# Patient Record
Sex: MALE | Race: WHITE | NOT HISPANIC OR LATINO | Employment: FULL TIME | URBAN - METROPOLITAN AREA
[De-identification: names, ages, dates, MRNs, and addresses within clinical notes are randomized per-mention and may not be internally consistent; named-entity substitution may affect disease eponyms.]

---

## 2023-05-30 LAB
ALANINE AMINOTRANSFERASE (SGPT) (U/L) IN SER/PLAS: 21 U/L (ref 10–52)
ALBUMIN (G/DL) IN SER/PLAS: 4.5 G/DL (ref 3.4–5)
ALKALINE PHOSPHATASE (U/L) IN SER/PLAS: 45 U/L (ref 33–136)
ANION GAP IN SER/PLAS: 13 MMOL/L (ref 10–20)
APPEARANCE, URINE: NORMAL
ASPARTATE AMINOTRANSFERASE (SGOT) (U/L) IN SER/PLAS: 20 U/L (ref 9–39)
BASOPHILS (10*3/UL) IN BLOOD BY AUTOMATED COUNT: 0.07 X10E9/L (ref 0–0.1)
BASOPHILS/100 LEUKOCYTES IN BLOOD BY AUTOMATED COUNT: 1 % (ref 0–2)
BILIRUBIN DIRECT (MG/DL) IN SER/PLAS: 0.2 MG/DL (ref 0–0.3)
BILIRUBIN TOTAL (MG/DL) IN SER/PLAS: 1.4 MG/DL (ref 0–1.2)
BILIRUBIN, URINE: NEGATIVE
BLOOD, URINE: NEGATIVE
CALCIDIOL (25 OH VITAMIN D3) (NG/ML) IN SER/PLAS: 42 NG/ML
CALCIUM (MG/DL) IN SER/PLAS: 10 MG/DL (ref 8.6–10.6)
CARBON DIOXIDE, TOTAL (MMOL/L) IN SER/PLAS: 30 MMOL/L (ref 21–32)
CHLORIDE (MMOL/L) IN SER/PLAS: 105 MMOL/L (ref 98–107)
CHOLESTEROL (MG/DL) IN SER/PLAS: 166 MG/DL (ref 0–199)
CHOLESTEROL IN HDL (MG/DL) IN SER/PLAS: 59.7 MG/DL
CHOLESTEROL/HDL RATIO: 2.8
COLOR, URINE: NORMAL
CREATININE (MG/DL) IN SER/PLAS: 0.97 MG/DL (ref 0.5–1.3)
EOSINOPHILS (10*3/UL) IN BLOOD BY AUTOMATED COUNT: 0.28 X10E9/L (ref 0–0.7)
EOSINOPHILS/100 LEUKOCYTES IN BLOOD BY AUTOMATED COUNT: 4 % (ref 0–6)
ERYTHROCYTE DISTRIBUTION WIDTH (RATIO) BY AUTOMATED COUNT: 13.2 % (ref 11.5–14.5)
ERYTHROCYTE MEAN CORPUSCULAR HEMOGLOBIN CONCENTRATION (G/DL) BY AUTOMATED: 31.8 G/DL (ref 32–36)
ERYTHROCYTE MEAN CORPUSCULAR VOLUME (FL) BY AUTOMATED COUNT: 88 FL (ref 80–100)
ERYTHROCYTES (10*6/UL) IN BLOOD BY AUTOMATED COUNT: 5.05 X10E12/L (ref 4.5–5.9)
GFR MALE: 84 ML/MIN/1.73M2
GLUCOSE (MG/DL) IN SER/PLAS: 83 MG/DL (ref 74–99)
GLUCOSE, URINE: NEGATIVE MG/DL
HEMATOCRIT (%) IN BLOOD BY AUTOMATED COUNT: 44.6 % (ref 41–52)
HEMOGLOBIN (G/DL) IN BLOOD: 14.2 G/DL (ref 13.5–17.5)
IMMATURE GRANULOCYTES/100 LEUKOCYTES IN BLOOD BY AUTOMATED COUNT: 0.6 % (ref 0–0.9)
KETONES, URINE: NEGATIVE MG/DL
LDL: 90 MG/DL (ref 0–99)
LEUKOCYTE ESTERASE, URINE: NEGATIVE
LEUKOCYTES (10*3/UL) IN BLOOD BY AUTOMATED COUNT: 6.9 X10E9/L (ref 4.4–11.3)
LYMPHOCYTES (10*3/UL) IN BLOOD BY AUTOMATED COUNT: 2.02 X10E9/L (ref 1.2–4.8)
LYMPHOCYTES/100 LEUKOCYTES IN BLOOD BY AUTOMATED COUNT: 29.1 % (ref 13–44)
MONOCYTES (10*3/UL) IN BLOOD BY AUTOMATED COUNT: 0.48 X10E9/L (ref 0.1–1)
MONOCYTES/100 LEUKOCYTES IN BLOOD BY AUTOMATED COUNT: 6.9 % (ref 2–10)
NEUTROPHILS (10*3/UL) IN BLOOD BY AUTOMATED COUNT: 4.04 X10E9/L (ref 1.2–7.7)
NEUTROPHILS/100 LEUKOCYTES IN BLOOD BY AUTOMATED COUNT: 58.4 % (ref 40–80)
NITRITE, URINE: NEGATIVE
NRBC (PER 100 WBCS) BY AUTOMATED COUNT: 0 /100 WBC (ref 0–0)
PH, URINE: 5 (ref 5–8)
PLATELETS (10*3/UL) IN BLOOD AUTOMATED COUNT: 165 X10E9/L (ref 150–450)
POTASSIUM (MMOL/L) IN SER/PLAS: 4.7 MMOL/L (ref 3.5–5.3)
PROSTATE SPECIFIC ANTIGEN,SCREEN: 0.66 NG/ML (ref 0–4)
PROTEIN TOTAL: 6.7 G/DL (ref 6.4–8.2)
PROTEIN, URINE: NEGATIVE MG/DL
SODIUM (MMOL/L) IN SER/PLAS: 143 MMOL/L (ref 136–145)
SPECIFIC GRAVITY, URINE: 1.03 (ref 1–1.03)
TRIGLYCERIDE (MG/DL) IN SER/PLAS: 80 MG/DL (ref 0–149)
UREA NITROGEN (MG/DL) IN SER/PLAS: 19 MG/DL (ref 6–23)
UROBILINOGEN, URINE: <2 MG/DL (ref 0–1.9)
VLDL: 16 MG/DL (ref 0–40)

## 2024-04-15 DIAGNOSIS — E78.5 HYPERLIPIDEMIA, UNSPECIFIED HYPERLIPIDEMIA TYPE: ICD-10-CM

## 2024-04-15 DIAGNOSIS — R53.83 OTHER FATIGUE: Primary | ICD-10-CM

## 2024-04-15 DIAGNOSIS — N40.0 BENIGN PROSTATIC HYPERPLASIA WITHOUT LOWER URINARY TRACT SYMPTOMS: ICD-10-CM

## 2024-04-15 DIAGNOSIS — E55.9 VITAMIN D DEFICIENCY: ICD-10-CM

## 2024-04-17 ENCOUNTER — LAB (OUTPATIENT)
Dept: LAB | Facility: LAB | Age: 69
End: 2024-04-17
Payer: COMMERCIAL

## 2024-04-17 DIAGNOSIS — N40.0 BENIGN PROSTATIC HYPERPLASIA WITHOUT LOWER URINARY TRACT SYMPTOMS: ICD-10-CM

## 2024-04-17 DIAGNOSIS — E78.5 HYPERLIPIDEMIA, UNSPECIFIED HYPERLIPIDEMIA TYPE: ICD-10-CM

## 2024-04-17 DIAGNOSIS — E55.9 VITAMIN D DEFICIENCY: ICD-10-CM

## 2024-04-17 DIAGNOSIS — R53.83 OTHER FATIGUE: ICD-10-CM

## 2024-04-17 LAB
APPEARANCE UR: CLEAR
BILIRUB UR STRIP.AUTO-MCNC: NEGATIVE MG/DL
COLOR UR: NORMAL
ERYTHROCYTE [DISTWIDTH] IN BLOOD BY AUTOMATED COUNT: 12.8 % (ref 11.5–14.5)
GLUCOSE UR STRIP.AUTO-MCNC: NORMAL MG/DL
HCT VFR BLD AUTO: 43.7 % (ref 41–52)
HGB BLD-MCNC: 14.5 G/DL (ref 13.5–17.5)
KETONES UR STRIP.AUTO-MCNC: NEGATIVE MG/DL
LEUKOCYTE ESTERASE UR QL STRIP.AUTO: NEGATIVE
MCH RBC QN AUTO: 28.5 PG (ref 26–34)
MCHC RBC AUTO-ENTMCNC: 33.2 G/DL (ref 32–36)
MCV RBC AUTO: 86 FL (ref 80–100)
NITRITE UR QL STRIP.AUTO: NEGATIVE
NRBC BLD-RTO: 0 /100 WBCS (ref 0–0)
PH UR STRIP.AUTO: 5.5 [PH]
PLATELET # BLD AUTO: 182 X10*3/UL (ref 150–450)
PROT UR STRIP.AUTO-MCNC: NEGATIVE MG/DL
RBC # BLD AUTO: 5.09 X10*6/UL (ref 4.5–5.9)
RBC # UR STRIP.AUTO: NEGATIVE /UL
SP GR UR STRIP.AUTO: 1.01
UROBILINOGEN UR STRIP.AUTO-MCNC: NORMAL MG/DL
WBC # BLD AUTO: 7.3 X10*3/UL (ref 4.4–11.3)

## 2024-04-17 PROCEDURE — 84443 ASSAY THYROID STIM HORMONE: CPT

## 2024-04-17 PROCEDURE — 80061 LIPID PANEL: CPT

## 2024-04-17 PROCEDURE — 36415 COLL VENOUS BLD VENIPUNCTURE: CPT

## 2024-04-17 PROCEDURE — 84153 ASSAY OF PSA TOTAL: CPT

## 2024-04-17 PROCEDURE — 80053 COMPREHEN METABOLIC PANEL: CPT

## 2024-04-17 PROCEDURE — 82306 VITAMIN D 25 HYDROXY: CPT

## 2024-04-17 PROCEDURE — 81003 URINALYSIS AUTO W/O SCOPE: CPT

## 2024-04-17 PROCEDURE — 85027 COMPLETE CBC AUTOMATED: CPT

## 2024-04-18 ENCOUNTER — OFFICE VISIT (OUTPATIENT)
Dept: GASTROENTEROLOGY | Facility: CLINIC | Age: 69
End: 2024-04-18
Payer: COMMERCIAL

## 2024-04-18 VITALS — HEART RATE: 71 BPM | BODY MASS INDEX: 24.59 KG/M2 | HEIGHT: 66 IN | OXYGEN SATURATION: 97 % | WEIGHT: 153 LBS

## 2024-04-18 DIAGNOSIS — E78.5 HYPERLIPIDEMIA, UNSPECIFIED HYPERLIPIDEMIA TYPE: Primary | ICD-10-CM

## 2024-04-18 LAB
25(OH)D3 SERPL-MCNC: 36 NG/ML (ref 30–100)
ALBUMIN SERPL BCP-MCNC: 4.4 G/DL (ref 3.4–5)
ALP SERPL-CCNC: 46 U/L (ref 33–136)
ALT SERPL W P-5'-P-CCNC: 19 U/L (ref 10–52)
ANION GAP SERPL CALC-SCNC: 14 MMOL/L (ref 10–20)
AST SERPL W P-5'-P-CCNC: 16 U/L (ref 9–39)
BILIRUB SERPL-MCNC: 1.4 MG/DL (ref 0–1.2)
BUN SERPL-MCNC: 14 MG/DL (ref 6–23)
CALCIUM SERPL-MCNC: 9.4 MG/DL (ref 8.6–10.6)
CHLORIDE SERPL-SCNC: 104 MMOL/L (ref 98–107)
CHOLEST SERPL-MCNC: 165 MG/DL (ref 0–199)
CHOLESTEROL/HDL RATIO: 2.8
CO2 SERPL-SCNC: 27 MMOL/L (ref 21–32)
CREAT SERPL-MCNC: 0.89 MG/DL (ref 0.5–1.3)
EGFRCR SERPLBLD CKD-EPI 2021: >90 ML/MIN/1.73M*2
GLUCOSE SERPL-MCNC: 77 MG/DL (ref 74–99)
HDLC SERPL-MCNC: 57.9 MG/DL
HOLD SPECIMEN: NORMAL
LDLC SERPL CALC-MCNC: 96 MG/DL
NON HDL CHOLESTEROL: 107 MG/DL (ref 0–149)
POTASSIUM SERPL-SCNC: 4 MMOL/L (ref 3.5–5.3)
PROT SERPL-MCNC: 6.5 G/DL (ref 6.4–8.2)
PSA SERPL-MCNC: 1.28 NG/ML
SODIUM SERPL-SCNC: 141 MMOL/L (ref 136–145)
TRIGL SERPL-MCNC: 58 MG/DL (ref 0–149)
TSH SERPL-ACNC: 1.59 MIU/L (ref 0.44–3.98)
VLDL: 12 MG/DL (ref 0–40)

## 2024-04-18 PROCEDURE — 1159F MED LIST DOCD IN RCRD: CPT | Performed by: INTERNAL MEDICINE

## 2024-04-18 PROCEDURE — 99214 OFFICE O/P EST MOD 30 MIN: CPT | Performed by: INTERNAL MEDICINE

## 2024-04-18 RX ORDER — ASCORBIC ACID 500 MG
500 TABLET ORAL DAILY
COMMUNITY

## 2024-04-18 RX ORDER — ERGOCALCIFEROL 1.25 MG/1
1.25 CAPSULE ORAL
COMMUNITY

## 2024-04-18 RX ORDER — ATORVASTATIN CALCIUM 20 MG/1
TABLET, FILM COATED ORAL
COMMUNITY
Start: 2016-11-04 | End: 2024-04-30 | Stop reason: SDUPTHER

## 2024-04-18 ASSESSMENT — ENCOUNTER SYMPTOMS
SHORTNESS OF BREATH: 0
ABDOMINAL PAIN: 0
FATIGUE: 0
CONSTIPATION: 0
BLOOD IN STOOL: 0
DYSURIA: 0
COUGH: 0
DIARRHEA: 0
ARTHRALGIAS: 1
HEADACHES: 0
DIFFICULTY URINATING: 0

## 2024-04-18 NOTE — PROGRESS NOTES
Subjective     History of Present Illness:   Fidel Dorado is a 69 y.o. male here for physical.  Feeling well, some fatigue    Review of Systems  Review of Systems   Constitutional:  Negative for fatigue.   HENT:  Negative for nosebleeds.    Eyes:  Negative for visual disturbance.   Respiratory:  Negative for cough and shortness of breath.    Cardiovascular:  Negative for chest pain.   Gastrointestinal:  Negative for abdominal pain, blood in stool, constipation and diarrhea.   Genitourinary:  Negative for difficulty urinating and dysuria.        No sexual dysfunction   Musculoskeletal:  Positive for arthralgias.        Bilateral knee pain    Neurological:  Negative for headaches.       Allergies  No Known Allergies    Medications  Current Outpatient Medications   Medication Instructions    ascorbic acid (VITAMIN C) 500 mg, oral, Daily    atorvastatin (Lipitor) 20 mg tablet TAKE 1 TABLET BY MOUTH DAILY **LAST REFILL UNTIL SEEN IN OFFICE**    ergocalciferol (VITAMIN D-2) 1.25 mg, oral, Once Weekly        Objective   Visit Vitals  Pulse 71      Physical Exam  Constitutional:       Appearance: Normal appearance.   HENT:      Head: Normocephalic.   Eyes:      Pupils: Pupils are equal, round, and reactive to light.   Neck:      Vascular: No carotid bruit.   Cardiovascular:      Rate and Rhythm: Regular rhythm.      Heart sounds: Normal heart sounds.   Pulmonary:      Breath sounds: Normal breath sounds.   Abdominal:      Palpations: Abdomen is soft.      Tenderness: There is no abdominal tenderness.   Genitourinary:     Penis: Normal.       Testes: Normal.      Prostate: Normal.      Rectum: Normal. Guaiac result negative.   Skin:     General: Skin is warm and dry.   Neurological:      Mental Status: He is alert.         138/76      Lab Results   Component Value Date    WBC 7.3 04/17/2024    WBC 6.9 05/30/2023    WBC 6.0 04/26/2022    HGB 14.5 04/17/2024    HGB 14.2 05/30/2023    HGB 14.5 04/26/2022    HCT 43.7  04/17/2024    HCT 44.6 05/30/2023    HCT 48.0 04/26/2022     04/17/2024     05/30/2023     04/26/2022     Lab Results   Component Value Date     04/17/2024     05/30/2023     04/26/2022    K 4.0 04/17/2024    K 4.7 05/30/2023    K 4.5 04/26/2022     04/17/2024     05/30/2023     04/26/2022    CO2 27 04/17/2024    CO2 30 05/30/2023    CO2 28 04/26/2022    BUN 14 04/17/2024    BUN 19 05/30/2023    BUN 25 (H) 04/26/2022    CREATININE 0.89 04/17/2024    CREATININE 0.97 05/30/2023    CREATININE 0.95 04/26/2022    CALCIUM 9.4 04/17/2024    CALCIUM 10.0 05/30/2023    CALCIUM 9.7 04/26/2022    PROT 6.5 04/17/2024    PROT 6.7 05/30/2023    PROT 6.6 04/26/2022    BILITOT 1.4 (H) 04/17/2024    BILITOT 1.4 (H) 05/30/2023    BILITOT 1.4 (H) 04/26/2022    ALKPHOS 46 04/17/2024    ALKPHOS 45 05/30/2023    ALKPHOS 39 04/26/2022    ALT 19 04/17/2024    ALT 21 05/30/2023    ALT 18 04/26/2022    AST 16 04/17/2024    AST 20 05/30/2023    AST 17 04/26/2022    GLUCOSE 77 04/17/2024    GLUCOSE 83 05/30/2023    GLUCOSE 93 04/26/2022    CHOL 165 04/17/2024    CHOL 166 05/30/2023    CHOL 213 (H) 04/26/2022    LDLF 90 05/30/2023    LDLF 137 (H) 04/26/2022    LDLF 114 (H) 03/03/2021    CHHDL 2.8 04/17/2024    CHHDL 2.8 05/30/2023    CHHDL 3.5 04/26/2022     ELECTROCARDIOGRAM RHYTHM STRIP  Ordered by an unspecified provider.           Fidel Dorado is a 69 y.o. male for physical exam - feeing well - BP, lipids, glucose at or near target - will repeat CT cardiac scoring (last done 2016)  -  due for colonoscopy 2026 - encouraged continued active lifestyle, regular exercise        Solo Saldana MD

## 2024-04-30 ENCOUNTER — TELEPHONE (OUTPATIENT)
Dept: GASTROENTEROLOGY | Facility: CLINIC | Age: 69
End: 2024-04-30
Payer: COMMERCIAL

## 2024-04-30 DIAGNOSIS — E78.5 HYPERLIPIDEMIA, UNSPECIFIED HYPERLIPIDEMIA TYPE: Primary | ICD-10-CM

## 2024-04-30 RX ORDER — ATORVASTATIN CALCIUM 20 MG/1
20 TABLET, FILM COATED ORAL DAILY
Qty: 90 TABLET | Refills: 3 | Status: SHIPPED | OUTPATIENT
Start: 2024-04-30 | End: 2025-04-30

## 2024-07-01 ENCOUNTER — OFFICE VISIT (OUTPATIENT)
Dept: ORTHOPEDIC SURGERY | Facility: HOSPITAL | Age: 69
End: 2024-07-01
Payer: COMMERCIAL

## 2024-07-01 ENCOUNTER — HOSPITAL ENCOUNTER (OUTPATIENT)
Dept: RADIOLOGY | Facility: HOSPITAL | Age: 69
End: 2024-07-01
Payer: COMMERCIAL

## 2024-07-01 ENCOUNTER — HOSPITAL ENCOUNTER (OUTPATIENT)
Dept: RADIOLOGY | Facility: HOSPITAL | Age: 69
Discharge: HOME | End: 2024-07-01
Payer: COMMERCIAL

## 2024-07-01 VITALS — HEIGHT: 66 IN | WEIGHT: 152 LBS | BODY MASS INDEX: 24.43 KG/M2

## 2024-07-01 DIAGNOSIS — M79.671 RIGHT FOOT PAIN: ICD-10-CM

## 2024-07-01 DIAGNOSIS — M72.2 PLANTAR FASCIITIS OF RIGHT FOOT: ICD-10-CM

## 2024-07-01 PROCEDURE — 99214 OFFICE O/P EST MOD 30 MIN: CPT | Performed by: EMERGENCY MEDICINE

## 2024-07-01 PROCEDURE — 1036F TOBACCO NON-USER: CPT | Performed by: EMERGENCY MEDICINE

## 2024-07-01 PROCEDURE — 1125F AMNT PAIN NOTED PAIN PRSNT: CPT | Performed by: EMERGENCY MEDICINE

## 2024-07-01 PROCEDURE — 1159F MED LIST DOCD IN RCRD: CPT | Performed by: EMERGENCY MEDICINE

## 2024-07-01 PROCEDURE — 73630 X-RAY EXAM OF FOOT: CPT | Mod: RT

## 2024-07-01 ASSESSMENT — PAIN - FUNCTIONAL ASSESSMENT: PAIN_FUNCTIONAL_ASSESSMENT: 0-10

## 2024-07-01 ASSESSMENT — PAIN SCALES - GENERAL: PAINLEVEL_OUTOF10: 6

## 2024-07-01 NOTE — PROGRESS NOTES
"Subjective   Fidel Dorado is a 69 y.o. male who presents for Pain of the Right Foot    HPI  69-year-old male present known to me is presenting with a new complaint today.  He presents with complaint of right foot pain that has had for approximately 3 months.  He localizes symptoms along the plantar aspect near his heel.  He notes that his symptoms are typically worse first thing in the morning and after prolonged weightbearing activities.  This is associated with an aching and throbbing sensation.  This is not associate with acute trauma, Forma ecchymosis, paresthesias, weakness, rash, erythema, or fevers.  He has tried home stretching exercises and heel inserts that have provided some relief.  He has not been taking any medications for pain control.      ROS: All pertinent positive symptoms are included in the history of present illness.    All other systems have been reviewed and are negative and noncontributory to this patient's current ailments.    Objective     Vitals:    07/01/24 1529   Weight: 68.9 kg (152 lb)   Height: 1.676 m (5' 6\")       Physical Exam  General/Constitutional: No apparent distress. Well-nourished and well developed.  Head: Normocephalic, Atraumatic.   Eyes: EOMI.  Vascular: No edema, swelling or tenderness, except as noted in detailed exam.  Respiratory: Non-labored breathing.  Integumentary: No impressive skin lesions present, except as noted in detailed exam.  Neurological: Alert and oriented.  Psychological:  Normal mood and affect.  Musculoskeletal: Normal, except as noted in detailed exam.  Right ankle: No rash.  No deformity.  No erythema.  Negative anterior drawer.  Dorsiflexion 10.  Plantarflexion 45.  Eversion 5.  Inversion 15.  Full muscle strength with dorsiflexion, plantarflexion, eversion, and inversion.  Right foot: No rash.  No deformity.  No erythema.  Tenderness over the calcaneal insertion of the plantar fascia.  No tenderness over the fifth metatarsal.  No crepitus.  " No ecchymosis.    Imaging:   Radiographs:   Right foot AP, oblique, lateral: No acute fractures or dislocations.  Mild midfoot DJD present.  Calcification present along the calcaneal insertion of the plantar fascia.      Assessment/Plan   Problem List Items Addressed This Visit    None  Visit Diagnoses       Right foot pain        Relevant Orders    XR foot right 3+ views    Plantar fasciitis of right foot        Relevant Orders    Referral to Physical Therapy          I discussed imaging and examination findings with patient.  I do feel that his symptoms are likely due to plantar fasciitis.  He has been working on home stretching exercises, however I do feel that he would benefit from formal physical therapy.  Therefore, I have given him a referral for this.  He is currently using heel inserts, so discussed with patient to continue using these for both feet.  I did also discuss with patient the option of using topical Voltaren gel 3-4 times daily for pain control.  We did discuss the option of a therapeutic corticosteroid injection, however he prefers to hold off at this time.  I like to see him back as needed pain persist or worsens.  If he does not have vast improvement, will likely again discuss the option of a therapeutic corticosteroid injection.    Thomas Dumont,   Sports Medicine  [facility] [place]    ** Please excuse any errors in grammar or translation related to this dictation. Voice recognition software was utilized to prepare this document. **

## 2024-07-22 ENCOUNTER — HOSPITAL ENCOUNTER (OUTPATIENT)
Dept: RADIOLOGY | Facility: HOSPITAL | Age: 69
Discharge: HOME | End: 2024-07-22
Payer: COMMERCIAL

## 2024-07-22 DIAGNOSIS — E78.5 HYPERLIPIDEMIA, UNSPECIFIED HYPERLIPIDEMIA TYPE: ICD-10-CM

## 2024-07-22 PROCEDURE — 75571 CT HRT W/O DYE W/CA TEST: CPT

## 2024-07-26 ENCOUNTER — EVALUATION (OUTPATIENT)
Dept: PHYSICAL THERAPY | Facility: CLINIC | Age: 69
End: 2024-07-26
Payer: COMMERCIAL

## 2024-07-26 DIAGNOSIS — M72.2 PLANTAR FASCIITIS OF RIGHT FOOT: Primary | ICD-10-CM

## 2024-07-26 PROCEDURE — 97161 PT EVAL LOW COMPLEX 20 MIN: CPT | Mod: GP | Performed by: PHYSICAL THERAPIST

## 2024-07-26 PROCEDURE — 97140 MANUAL THERAPY 1/> REGIONS: CPT | Mod: GP | Performed by: PHYSICAL THERAPIST

## 2024-07-26 PROCEDURE — 97110 THERAPEUTIC EXERCISES: CPT | Mod: GP | Performed by: PHYSICAL THERAPIST

## 2024-07-26 ASSESSMENT — ENCOUNTER SYMPTOMS
DEPRESSION: 0
LOSS OF SENSATION IN FEET: 0
OCCASIONAL FEELINGS OF UNSTEADINESS: 0

## 2024-07-26 NOTE — PROGRESS NOTES
Physical Therapy  Physical Therapy Orthopedic Evaluation    Patient Name: Fidel Dorado  MRN: [id number]  Today's Date: 7/26/2024  Time Calculation  Start Time: 0745  Stop Time: 0830  Time Calculation (min): 45 min    PT Evaluation Time Entry  PT Evaluation (Low) Time Entry: 15  PT Therapeutic Procedures Time Entry  Manual Therapy Time Entry: 10  Therapeutic Exercise Time Entry: 13       Insurance:  Visit number: 1 of 60  Authorization info: no auth  Insurance Type: Payor: MEDICAL [facility] / Plan: [employer] / Product Type: *No Product type* /      Current Problem  1. Plantar fasciitis of right foot  Follow Up In Physical Therapy          Surgery:    Referred by: Dr. Dumont    Precautions:     Medical History Form: Reviewed (scanned into chart)  Reviewed medical form, [place], Falls risk, Jain beliefs, PHQ    Subjective:   Subjective   Chief Complaint: R foot pain  Onset: 3-4 months ago  ORALIA: worsened over time     General:    right foot pain that has had for approximately 3 months.  He localizes symptoms along the plantar aspect near his heel.  He notes that his symptoms are typically worse first thing in the morning and after prolonged weightbearing activities.  This is associated with an aching and throbbing sensation   Current Condition:   Better, started using inserts, wearing a pair of recovery slides around the house. Pain first thing in the morning is the worse    Pain:     Location: R foot  Rating: Best-0, Current-2, Worst-8  Description: sharp, dull throughout the day  Aggravating Factors:  walking barefoot  Relieving Factors:  supportive shoes, rolling foot water bottle    Relevant Information (PMH & Previous Tests/Imaging): Right foot AP, oblique, lateral: No acute fractures or dislocations.  Mild midfoot DJD present.  Calcification present along the calcaneal insertion of the plantar fascia.     Previous Interventions/Treatments: None    Prior Level of Function  "(PLOF)  Patient previously independent with all ADLs  Exercise/Physical Activity: 4 day week elliptical/bike  Work/School: [employer]    Patients Living Environment: Reviewed and no concern  Primary Language: English  Patient's Goal(s) for Therapy: Reduce pain    Red Flags: Do you have any of the following? No  Fever/chills, unexplained weight changes, dizziness/fainting, unexplained change in bowel or bladder functions, unexplained malaise or muscle weakness, night pain/sweats, numbness or tingling    Objective:  Objective     Palpation/Observation  TTP inferior calcaneal tubercle   Posture  Pes planus bilateral right greater than left  Special Testing  Positive windlass test, positive navicular drop  Negative Liu test, anterior and posterior drawer  ROM  7/26/2024  Right ankle  8 degrees active dorsiflexion, 12 degrees weightbearing dorsiflexion, all other areas within normal limits  Left ankle  12 degrees dorsiflexion, 12 degrees weightbearing dorsiflexion, all other areas within normal limits  Midfoot forefoot joint mobility within normal limits, calcaneal mobility mildly restricted right greater than left  Strength  7/26/2024  4+/5 plantarflexion dorsiflexion  Sensation  Full sensation without paresthesias  Reflexes      Transfers: Within normal limits within normal limits without assistive device  Gait: Within normal limits without assistive device  SL Balance:   DL Squat: Within normal limits  SL Squat:      Outcome Measures:  Other Measures  Lower Extremity Funtional Score (LEFS): 51/80   7/26/2024  Treatments:  Ther-EX:  - Seated Calf Towel Stretch  - 1 x daily - 3 sets - 10 reps  - Gastroc Stretch on Wall  - 1 x daily - 7 x weekly - 3 sets - 10 reps  - Seated Eccentric Ankle Plantar Flexion with Resistance  - 1 x daily - 2 sets - 10 reps - 3\" hold  - Long Sitting Ankle Plantar Flexion with Resistance  - 1 x daily - 2 sets - 10 reps - 3\" hold  - Towel Scrunches  - 1 x daily - 3 sets - 10 reps  Manual:  Soft " tissues techniques to the para fascia  Modalities:  Continue with utilization of ice rolling    PT Evaluation Time Entry  PT Evaluation (Low) Time Entry: 15  PT Therapeutic Procedures Time Entry  Manual Therapy Time Entry: 10  Therapeutic Exercise Time Entry: 13       EDUCATION: Home exercise program, plan of care, activity modifications, pain management, and injury pathology     Code: [id number]    Assessment: Patient presents with signs and symptoms consistent with R foot plantar fasciitis, resulting in limited participation in pain-free ADLs and inability to perform at their prior level of function. Pt would benefit from physical therapy to address the impairments found & listed previously in the objective section in order to return to safe and pain-free ADLs and prior level of function.     Complexity:Low  Prognosis: good  Response to care: good    Problem List:  pain  function  mobility  Activity tolerance  Plan:     Planned Interventions include: therapeutic exercise, self-care home management, manual therapy, therapeutic activities, gait training, neuromuscular coordination, vasopneumatic, dry needling, aquatic therapy    Next Treatment: Progress strengthening, continue with soft tissue techniques  Frequency: 1-2 x Week  Duration: 6 Weeks  Goals: Set and discussed today  Active       PT Problem       PT Goal 1       Start:  07/26/24    Expected End:  09/06/24       1.  Patient to be independent with home exercise program  2.  Patient to have improved dorsiflexion weightbearing by 5 degrees for improved weightbearing performance  3.  Patient to report pain less than 3/10 with first steps in the a.m.  4.  Patient able to return to exercising up to 30 minutes maintaining pain less than 2/10  5.  Patient to have improved LEFS score for decreased pain symptoms returning to daily activities               Plan of care was developed with input and agreement by the patient      Roc Kovacs, PT

## 2024-08-02 ENCOUNTER — TREATMENT (OUTPATIENT)
Dept: PHYSICAL THERAPY | Facility: CLINIC | Age: 69
End: 2024-08-02
Payer: COMMERCIAL

## 2024-08-02 DIAGNOSIS — M72.2 PLANTAR FASCIITIS OF RIGHT FOOT: ICD-10-CM

## 2024-08-02 PROCEDURE — 97140 MANUAL THERAPY 1/> REGIONS: CPT | Mod: GP | Performed by: PHYSICAL THERAPIST

## 2024-08-02 PROCEDURE — 97110 THERAPEUTIC EXERCISES: CPT | Mod: GP | Performed by: PHYSICAL THERAPIST

## 2024-08-02 NOTE — PROGRESS NOTES
Physical Therapy  Physical Therapy Treatment Note    Patient Name: Fidel Dorado  MRN: [id number]  Today's Date: 8/2/2024  Time Calculation  Start Time: 1304  Stop Time: 1342  Time Calculation (min): 38 min       PT Therapeutic Procedures Time Entry  Manual Therapy Time Entry: 23  Therapeutic Exercise Time Entry: 15     Referring:Jeremy Dumont DO    Insurance:  Visit number: 2 of 60    Authorization info: No auth  Insurance Type: Payor: [employer] [place] / Plan: [employer] / Product Type: *No Product type* /     Current Problem  1. Plantar fasciitis of right foot  Follow Up In Physical Therapy          General    right foot pain that has had for approximately 3 months.  He localizes symptoms along the plantar aspect near his heel.  He notes that his symptoms are typically worse first thing in the morning and after prolonged weightbearing activities.  This is associated with an aching and throbbing sensation   Current Condition:   Better, started using inserts, wearing a pair of recovery slides around the house. Pain first thing in the morning is the worse    Precautions       Subjective:   Subjective   Patient reports he is doing good, doing his exercises at home and feels they are helpful  HEP compliance: YES  Pain   Mild plantar fascia pain  Objective:   Objective   Right foot AP, oblique, lateral: No acute fractures or dislocations. Mild midfoot DJD present. Calcification present along the calcaneal insertion of the plantar fascia.   ROM  7/26/2024  Right ankle  8 degrees active dorsiflexion, 12 degrees weightbearing dorsiflexion, all other areas within normal limits  Left ankle  12 degrees dorsiflexion, 12 degrees weightbearing dorsiflexion, all other areas within normal limits  Midfoot forefoot joint mobility within normal limits, calcaneal mobility mildly restricted right greater than left  Strength  7/26/2024  4+/5 plantarflexion dorsiflexion    Treatments:   Ther Ex  Standing  gastroc soleus slant board 2 x 1'  Seated PF 7.5# 2 x 15  Seated DF 2# 3x10  Long sitting toe flexion YTB 2 x10  Manual  STM/IASTM plantar fascia       PT Therapeutic Procedures Time Entry  Manual Therapy Time Entry: 23  Therapeutic Exercise Time Entry: 15     HEP Access Code:[email or web address]   Assessment:  Pain remains about the same, consistency of home program has decreased sensitivity through plantar fascia with manual techniques      Plan: Continue plan of care     Goals:  Active       PT Problem       PT Goal 1       Start:  07/26/24    Expected End:  09/06/24       1.  Patient to be independent with home exercise program  2.  Patient to have improved dorsiflexion weightbearing by 5 degrees for improved weightbearing performance  3.  Patient to report pain less than 3/10 with first steps in the a.m.  4.  Patient able to return to exercising up to 30 minutes maintaining pain less than 2/10  5.  Patient to have improved LEFS score for decreased pain symptoms returning to daily activities                Roc Kovacs, PT

## 2024-08-09 ENCOUNTER — APPOINTMENT (OUTPATIENT)
Dept: PHYSICAL THERAPY | Facility: CLINIC | Age: 69
End: 2024-08-09
Payer: COMMERCIAL

## 2024-08-16 ENCOUNTER — TREATMENT (OUTPATIENT)
Dept: PHYSICAL THERAPY | Facility: CLINIC | Age: 69
End: 2024-08-16
Payer: COMMERCIAL

## 2024-08-16 DIAGNOSIS — M72.2 PLANTAR FASCIITIS OF RIGHT FOOT: ICD-10-CM

## 2024-08-16 PROCEDURE — 97140 MANUAL THERAPY 1/> REGIONS: CPT | Mod: GP | Performed by: PHYSICAL THERAPIST

## 2024-08-16 PROCEDURE — 97110 THERAPEUTIC EXERCISES: CPT | Mod: GP | Performed by: PHYSICAL THERAPIST

## 2024-08-16 NOTE — PROGRESS NOTES
Physical Therapy  Physical Therapy Treatment Note    Patient Name: Fidel Dorado  MRN: [id number]  Today's Date: 8/16/2024  Time Calculation  Start Time: 1300  Stop Time: 1345  Time Calculation (min): 45 min       PT Therapeutic Procedures Time Entry  Manual Therapy Time Entry: 25  Therapeutic Exercise Time Entry: 20     Referring:Jeremy Dumont DO    Insurance:  Visit number: 3 of 60    Authorization info: No auth  Insurance Type: Payor: MEDICAL [facility] / Plan: [employer] / Product Type: *No Product type* /     Current Problem  1. Plantar fasciitis of right foot  Follow Up In Physical Therapy          General    right foot pain that has had for approximately 3 months.  He localizes symptoms along the plantar aspect near his heel.  He notes that his symptoms are typically worse first thing in the morning and after prolonged weightbearing activities.  This is associated with an aching and throbbing sensation   Current Condition:   Better, started using inserts, wearing a pair of recovery slides around the house. Pain first thing in the morning is the worse    Precautions       Subjective:   Subjective   Patient reports he is doing good, doing his exercises at home and feels they are helpful  HEP compliance: YES  Pain   Mild plantar fascia pain  Objective:   Objective   Right foot AP, oblique, lateral: No acute fractures or dislocations. Mild midfoot DJD present. Calcification present along the calcaneal insertion of the plantar fascia.   ROM  7/26/2024  Right ankle  8 degrees active dorsiflexion, 12 degrees weightbearing dorsiflexion, all other areas within normal limits  Left ankle  12 degrees dorsiflexion, 12 degrees weightbearing dorsiflexion, all other areas within normal limits  Midfoot forefoot joint mobility within normal limits, calcaneal mobility mildly restricted right greater than left  Strength  7/26/2024  4+/5 plantarflexion dorsiflexion    Treatments:   Ther Ex  Standing  gastroc soleus slant board 2 x 1'  Seated PF 9# 2 x 15  Seated DF RTB 3x10    Manual  STM/IASTM plantar fascia       PT Therapeutic Procedures Time Entry  Manual Therapy Time Entry: 25  Therapeutic Exercise Time Entry: 20     HEP Access Code:[email or web address]   Assessment:  Pain is improved through the plantar fascia, good tolerance to there-ex     Plan: Continue plan of care     Goals:  Active       PT Problem       PT Goal 1       Start:  07/26/24    Expected End:  09/06/24       1.  Patient to be independent with home exercise program  2.  Patient to have improved dorsiflexion weightbearing by 5 degrees for improved weightbearing performance  3.  Patient to report pain less than 3/10 with first steps in the a.m.  4.  Patient able to return to exercising up to 30 minutes maintaining pain less than 2/10  5.  Patient to have improved LEFS score for decreased pain symptoms returning to daily activities                Roc Kovacs, PT

## 2024-08-23 ENCOUNTER — TREATMENT (OUTPATIENT)
Dept: PHYSICAL THERAPY | Facility: CLINIC | Age: 69
End: 2024-08-23
Payer: COMMERCIAL

## 2024-08-23 DIAGNOSIS — M72.2 PLANTAR FASCIITIS OF RIGHT FOOT: ICD-10-CM

## 2024-08-23 PROCEDURE — 97110 THERAPEUTIC EXERCISES: CPT | Mod: GP | Performed by: PHYSICAL THERAPIST

## 2024-08-23 PROCEDURE — 97140 MANUAL THERAPY 1/> REGIONS: CPT | Mod: GP | Performed by: PHYSICAL THERAPIST

## 2024-08-23 NOTE — PROGRESS NOTES
Physical Therapy  Physical Therapy Treatment Note    Patient Name: Fidel Dorado  MRN: [id number]  Today's Date: 8/23/2024  Time Calculation  Start Time: 0915  Stop Time: 0950  Time Calculation (min): 35 min       PT Therapeutic Procedures Time Entry  Manual Therapy Time Entry: 20  Therapeutic Exercise Time Entry: 15     Referring:Jeremy Dumont DO    Insurance:  Visit number:4 of 60    Authorization info: No auth  Insurance Type: Payor: [employer] [place] / Plan: [employer] / Product Type: *No Product type* /     Current Problem  1. Plantar fasciitis of right foot  Follow Up In Physical Therapy          General    right foot pain that has had for approximately 3 months.  He localizes symptoms along the plantar aspect near his heel.  He notes that his symptoms are typically worse first thing in the morning and after prolonged weightbearing activities.  This is associated with an aching and throbbing sensation   Current Condition:   Better, started using inserts, wearing a pair of recovery slides around the house. Pain first thing in the morning is the worse    Precautions       Subjective:   Subjective   Patient reports he is doing good, not in much pain, notices it occasionally if he walks barefoot  HEP compliance: YES  Pain   Mild plantar fascia pain  Objective:   Objective   Right foot AP, oblique, lateral: No acute fractures or dislocations. Mild midfoot DJD present. Calcification present along the calcaneal insertion of the plantar fascia.   ROM  7/26/2024  Right ankle  8 degrees active dorsiflexion, 12 degrees weightbearing dorsiflexion, all other areas within normal limits  Left ankle  12 degrees dorsiflexion, 12 degrees weightbearing dorsiflexion, all other areas within normal limits  Midfoot forefoot joint mobility within normal limits, calcaneal mobility mildly restricted right greater than left  Strength  7/26/2024  4+/5 plantarflexion dorsiflexion    Treatments:   Ther  Ex  Standing gastroc soleus slant board 2 x 1'  Seated PF 9# 2 x 15  Seated DF RTB 3x10    Manual  STM/IASTM plantar fascia       PT Therapeutic Procedures Time Entry  Manual Therapy Time Entry: 20  Therapeutic Exercise Time Entry: 15     HEP Access Code:[email or web address]   Assessment:  Pain significantly improved through plantar fascia. He is prone secondary to heel spur. Good follow through with exercise and arch support     Plan: Chart on hold, focus on HEP     Goals:  Active       PT Problem       PT Goal 1       Start:  07/26/24    Expected End:  09/06/24       1.  Patient to be independent with home exercise program  2.  Patient to have improved dorsiflexion weightbearing by 5 degrees for improved weightbearing performance  3.  Patient to report pain less than 3/10 with first steps in the a.m.  4.  Patient able to return to exercising up to 30 minutes maintaining pain less than 2/10  5.  Patient to have improved LEFS score for decreased pain symptoms returning to daily activities                Roc Kovacs, PT

## 2025-02-07 ENCOUNTER — APPOINTMENT (OUTPATIENT)
Dept: ORTHOPEDIC SURGERY | Facility: CLINIC | Age: 70
End: 2025-02-07
Payer: COMMERCIAL

## 2025-02-07 ENCOUNTER — HOSPITAL ENCOUNTER (OUTPATIENT)
Dept: RADIOLOGY | Facility: CLINIC | Age: 70
Discharge: HOME | End: 2025-02-07
Payer: COMMERCIAL

## 2025-02-07 DIAGNOSIS — M16.11 ARTHRITIS OF RIGHT HIP: ICD-10-CM

## 2025-02-07 DIAGNOSIS — M25.551 RIGHT HIP PAIN: ICD-10-CM

## 2025-02-07 PROCEDURE — 99214 OFFICE O/P EST MOD 30 MIN: CPT | Performed by: EMERGENCY MEDICINE

## 2025-02-07 PROCEDURE — 73502 X-RAY EXAM HIP UNI 2-3 VIEWS: CPT | Mod: RT

## 2025-02-07 NOTE — PROGRESS NOTES
Subjective   Fidel Dorado is a 69 y.o. male who presents for Pain of the Right Hip    HPI  69-year-old male present known to me is presenting with new complaint today.  She presents with complaint of right hip pain that is had for the past 4 months.  Pain is of gradual onset and intermittent duration, of mild severity.  He does note pain is typically worse after sitting for prolonged periods of time and for the first few steps.  He does typically note the pain is worse in the mornings as well.  Pain does improve with increased activity.  He localizes his symptoms along the groin with radiation into the lateral and posterior aspect of his hip.  No previous injuries to the hip.  He does note that his left hip has began developing mild discomfort as well.    ROS: All pertinent positive symptoms are included in the history of present illness.    All other systems have been reviewed and are negative and noncontributory to this patient's current ailments.    Objective     There were no vitals filed for this visit.    Physical Exam  General/Constitutional: No apparent distress. Well-nourished and well developed.  Head: Normocephalic, Atraumatic.   Eyes: EOMI.  Vascular: No edema, swelling or tenderness, except as noted in detailed exam.  Respiratory: Non-labored breathing.  Integumentary: No impressive skin lesions present, except as noted in detailed exam.  Neurological: Alert and oriented.  Psychological:  Normal mood and affect.  Musculoskeletal: Normal, except as noted in detailed exam.  Right hip: No rash.  No deformity.  No erythema.  Flexion 100.  Internal rotation 5.  External rotation 20.  Extension 15.  Positive FADIR.  Negative AMERICA.  Full muscle strength all planes.  Negative straight leg raise.  Left hip: No rash.  No deformity.  No erythema.  Flexion 100.  Internal rotation 5.  Extension 15.  External rotation 20.  Negative AMERICA.  Negative FADIR.    Imaging:   Radiographs:   AP pelvis, right hip AP and  lateral: No acute fractures or dislocations.  Bilateral hip joint DJD.      Assessment/Plan   Problem List Items Addressed This Visit    None  Visit Diagnoses       Right hip pain        Relevant Orders    XR hip right with pelvis when performed 2 or 3 views          I discussed with patient and feel symptoms likely due to right hip DJD.  He does also have left hip DJD as well that is likely causing discomfort there intermittently.  I do feel that we can begin treating this conservatively with activity modifications, physical therapy, and analgesics.  He does take OTC Aleve intermittently for pain control.  I discussed with patient that he can use topical Voltaren gel 3-4 times daily for pain control and lieu of the oral Aleve.  I have given him a referral for physical therapy.  We did discuss the option of a therapeutic corticosteroid injection, however he feels that his pain does not warrant that today.  I feel this reasonable.  I would like to see him back as needed if pain persists or worsens.  If he does have worsening pain that time, I will likely again suggest a therapeutic corticosteroid injection.    Thomas Dumont,   Sports Medicine  [facility], [facility] [place]    ** Please excuse any errors in grammar or translation related to this dictation. Voice recognition software was utilized to prepare this document. **

## 2025-02-19 ENCOUNTER — APPOINTMENT (OUTPATIENT)
Dept: PRIMARY CARE | Facility: CLINIC | Age: 70
End: 2025-02-19
Payer: COMMERCIAL

## 2025-02-19 VITALS
BODY MASS INDEX: 25.18 KG/M2 | OXYGEN SATURATION: 98 % | HEART RATE: 56 BPM | WEIGHT: 156 LBS | SYSTOLIC BLOOD PRESSURE: 145 MMHG | DIASTOLIC BLOOD PRESSURE: 80 MMHG

## 2025-02-19 DIAGNOSIS — R93.1 ABNORMAL HEART SCORE CT: Primary | ICD-10-CM

## 2025-02-19 DIAGNOSIS — E78.5 HYPERLIPIDEMIA, UNSPECIFIED HYPERLIPIDEMIA TYPE: ICD-10-CM

## 2025-02-19 RX ORDER — ATORVASTATIN CALCIUM 40 MG/1
40 TABLET, FILM COATED ORAL DAILY
Qty: 30 TABLET | Refills: 2 | Status: SHIPPED | OUTPATIENT
Start: 2025-02-19 | End: 2025-05-20

## 2025-02-19 RX ORDER — NAPROXEN SODIUM 220 MG/1
81 TABLET, FILM COATED ORAL DAILY
Qty: 30 TABLET | Refills: 11 | Status: SHIPPED | OUTPATIENT
Start: 2025-02-19 | End: 2026-02-19

## 2025-02-19 RX ORDER — ALCLOMETASONE DIPROPIONATE 0.5 MG/G
OINTMENT TOPICAL
COMMUNITY
Start: 2024-10-15

## 2025-02-19 ASSESSMENT — PATIENT HEALTH QUESTIONNAIRE - PHQ9
1. LITTLE INTEREST OR PLEASURE IN DOING THINGS: NOT AT ALL
2. FEELING DOWN, DEPRESSED OR HOPELESS: NOT AT ALL
SUM OF ALL RESPONSES TO PHQ9 QUESTIONS 1 AND 2: 0

## 2025-02-19 ASSESSMENT — COLUMBIA-SUICIDE SEVERITY RATING SCALE - C-SSRS
1. IN THE PAST MONTH, HAVE YOU WISHED YOU WERE DEAD OR WISHED YOU COULD GO TO SLEEP AND NOT WAKE UP?: NO
2. HAVE YOU ACTUALLY HAD ANY THOUGHTS OF KILLING YOURSELF?: NO
6. HAVE YOU EVER DONE ANYTHING, STARTED TO DO ANYTHING, OR PREPARED TO DO ANYTHING TO END YOUR LIFE?: NO

## 2025-02-19 ASSESSMENT — ENCOUNTER SYMPTOMS
LOSS OF SENSATION IN FEET: 0
DEPRESSION: 0
OCCASIONAL FEELINGS OF UNSTEADINESS: 0

## 2025-02-19 ASSESSMENT — PAIN SCALES - GENERAL: PAINLEVEL_OUTOF10: 0-NO PAIN

## 2025-02-19 NOTE — PROGRESS NOTES
New patient here to establish care   Former PCP: Dr. Atilio Saldana (GI/primary care)    HPI    Denies CP, SOB, GAMINO, orthopnea, LE edema   No dysphagia or reflux   Bowels are regular, no melena or hematochezia     Rarely takes meloxicam for R knee OA and R foot plantar fasciitis, which helps.  Does not measure BP at home regularly. Elevated in office 145/80. States past 2-3 years has been steadily increasing.      12 point review of systems negative unless otherwise noted in HPI       Specialist(s):  - Ortho Dr. Jeremy Dumont for R knee and R foot management    Subjective     Medical:  - HLD  - R knee OA and R foot plantar fasciitis     Social:  - Drink alcohol socially  - Not currently smoker, smoked <100 cigarettes in lifetime.  - No illicit drugs    Family:  - Mom: passed from HF  - Dad: passed from kidney failure  - Brother: passed from lung cancer (49)  - Brother: passed from colon cancer (50s)    Surgical:  - L wrist ganglion cyst removal (~2020)    Allergies:  - NKDA    Meds:  - Atorvastatin 20 mg  - Meloxicam     Objective     Visit Vitals  /80   Pulse 56   Wt 70.8 kg (156 lb)   SpO2 98%   BMI 25.18 kg/m²   Smoking Status Never   BSA 1.82 m²        Physical Exam  Constitutional:       General: He is not in acute distress.     Appearance: Normal appearance.   Cardiovascular:      Rate and Rhythm: Normal rate and regular rhythm.      Pulses: Normal pulses.      Heart sounds: Normal heart sounds.   Pulmonary:      Effort: Pulmonary effort is normal.      Breath sounds: Normal breath sounds.   Abdominal:      General: Bowel sounds are normal.      Palpations: Abdomen is soft.      Tenderness: There is no abdominal tenderness. There is no guarding or rebound.   Musculoskeletal:      Right lower leg: No edema.      Left lower leg: No edema.   Skin:     General: Skin is warm and dry.   Neurological:      General: No focal deficit present.      Mental Status: He is alert and oriented to person, place, and time.    Psychiatric:         Mood and Affect: Mood normal.         Behavior: Behavior normal.        Assessment    #HLD  #Elevated cardiac score  - CT cardiac 7/2024: 578.06 (from 193.92)  - Referred to cardiology  - Start ASA 81 mg  - Increase atorvastatin 20 mg to 40 mg      #Elevated BP  - In office: 145/80  - Recommend monitoring, if persistently high then will start meds    #R hip OA  #R foot plantar fasciitis  -Follows Dr. cielo Bowell  - Cont meloxicam PRN       Influenza: Recommend  COVID: x5  Prevnar 13: Never  Pneumovax 23: Never  Prevnar 20: Recommend  RSV: Recommend  Shingrix: Recommend  Colorectal ca screening: Brother hx of CRC, Due 2026, per GI  PSA: 4/2024: wnml  Lung ca screening: NI      RTC 6 months    Alyssa Guerrero DO  Internal Medicine PGY-1

## 2025-02-19 NOTE — PATIENT INSTRUCTIONS
Start aspirin 81 mg daily, increase atorvastatin to 40 mg daily  Referred to cardiology. Please call [phone] to schedule.     Recommend flu, Prevnar 20 (pneumonia), RSV, and Shingles vaccines  Blood work ordered. Please complete fasting blood work. Fast for 10 hours, black coffee and water the morning of labs are OK.     Your blood pressure was elevated today. Please check your blood pressure 1-2x daily and write it down. Send the results via [employer]. Goal BP is less than 130/80.  To check BP: rest for 5 mintues, have your feet flat/uncrossed on the floor and your arm at heart level.        RTC 6 months

## 2025-02-24 ENCOUNTER — OFFICE VISIT (OUTPATIENT)
Dept: CARDIOLOGY | Facility: HOSPITAL | Age: 70
End: 2025-02-24
Payer: COMMERCIAL

## 2025-02-24 VITALS
DIASTOLIC BLOOD PRESSURE: 70 MMHG | WEIGHT: 158.07 LBS | HEART RATE: 62 BPM | BODY MASS INDEX: 25.51 KG/M2 | OXYGEN SATURATION: 97 % | SYSTOLIC BLOOD PRESSURE: 149 MMHG

## 2025-02-24 DIAGNOSIS — R93.1 ABNORMAL HEART SCORE CT: ICD-10-CM

## 2025-02-24 DIAGNOSIS — E78.5 HYPERLIPIDEMIA, UNSPECIFIED HYPERLIPIDEMIA TYPE: ICD-10-CM

## 2025-02-24 PROCEDURE — 1036F TOBACCO NON-USER: CPT | Performed by: INTERNAL MEDICINE

## 2025-02-24 PROCEDURE — 99204 OFFICE O/P NEW MOD 45 MIN: CPT | Performed by: INTERNAL MEDICINE

## 2025-02-24 PROCEDURE — 99214 OFFICE O/P EST MOD 30 MIN: CPT | Performed by: INTERNAL MEDICINE

## 2025-02-24 PROCEDURE — 1159F MED LIST DOCD IN RCRD: CPT | Performed by: INTERNAL MEDICINE

## 2025-02-24 RX ORDER — ATORVASTATIN CALCIUM 80 MG/1
80 TABLET, FILM COATED ORAL DAILY
Qty: 90 TABLET | Refills: 3 | Status: SHIPPED | OUTPATIENT
Start: 2025-02-24 | End: 2026-02-24

## 2025-02-24 NOTE — PROGRESS NOTES
Primary Care Physician: Nabeel Diamond DO   Date of Visit: 02/24/2025 10:45 AM EST  Type of Visit: New patient    Chief Complaint regular moderate  Elevated coronary calcium score    HPI  Fidel Dorado is a 69 y.o. male who presents establish care.    He denies any angina, shortness of breath, paroxysmal nocturnal dyspnea, orthopnea, peripheral edema, near-syncope, syncope, palpitations.    Reports he is fairly active, exercises an hour 4 to 5 days a week at the [facility]; gets moderate intensity aerobic exercise regularly.    Review of Systems   12 point review of systems was obtained in detail and is negative other than that noted above or below.     Past Medical/Surgical History:  Fidel has no past medical history on file.    Fidel has no past surgical history on file.    Social/Family History:  Fidel reports that he has never smoked. He has never used smokeless tobacco. He reports current alcohol use. He reports that he does not use drugs.    Fidel's family history is not on file.    Allergies:  No Known Allergies    Medications:  Current Outpatient Medications   Medication Sig Dispense Refill    alclometasone (Aclovate) 0.05 % ointment APPLY TWICE DAILY TO AFFECTED AREAS OF ECZEMA ON EYELIDS.      ascorbic acid (Vitamin C) 500 mg tablet Take 1 tablet (500 mg) by mouth once daily.      aspirin 81 mg chewable tablet Chew 1 tablet (81 mg) once daily. 30 tablet 11    ergocalciferol (Vitamin D-2) 1.25 MG (13718 UT) capsule Take 1 capsule (1,250 mcg) by mouth 1 (one) time per week.      atorvastatin (Lipitor) 80 mg tablet Take 1 tablet (80 mg) by mouth once daily. 90 tablet 3     No current facility-administered medications for this visit.       Objective:   Vitals:    02/24/25 1057   BP: 149/70   Pulse: 62   SpO2: 97%       S1-S2 normal, regular rate and rhythm  Fair to auscultation bilaterally    Labs and Imaging:      Latest Ref Rng & Units 1/27/2018    11:32 AM 6/28/2019    10:11 AM 3/3/2021    10:33 AM 4/26/2022  "    8:40 AM 5/30/2023     6:45 AM 4/17/2024     3:35 PM   Electrolytes   Na 136 - 145 mmol/L 139  139  140  142  143  141    K 3.5 - 5.3 mmol/L 4.3  4.2  4.5  4.5  4.7  4.0    Cl 98 - 107 mmol/L 104  104  103  106  105  104    CO2 21 - 32 mmol/L 30  26  30  28  30  27    Cr 0.50 - 1.30 mg/dL 0.98  1.05  1.01  0.95  0.97  0.89    Ca 8.6 - 10.6 mg/dL 9.6  9.8  9.8  9.7  10.0  9.4          Latest Ref Rng & Units 1/27/2018    11:32 AM 6/28/2019    10:11 AM 3/3/2021    10:33 AM 4/26/2022     8:40 AM 5/30/2023     6:45 AM 4/17/2024     3:35 PM   CBC   Hb 13.5 - 17.5 g/dL 13.9  14.1  14.5  14.5  14.2  14.5    Hct 41.0 - 52.0 % 42.2  42.6  45.2  48.0  44.6  43.7    MCV 80 - 100 fL 85  85  88  96  88  86    RDW 11.5 - 14.5 % 12.9  12.9  12.9  13.5  13.2  12.8          Latest Ref Rng & Units 1/27/2018    11:32 AM 6/28/2019    10:11 AM 3/3/2021    10:33 AM 4/26/2022     8:40 AM 5/30/2023     6:45 AM 4/17/2024     3:35 PM   Lipids   Chol 0 - 199 mg/dL 186  193  187  213  166  165    HDL mg/dL 56.3  53.1  60.7  61.3  59.7  57.9    LDL <=99 mg/dL 119  130  114  137  90  96    Trig 0 - 149 mg/dL 52  51  63  76  80  58    ALT 10 - 52 U/L 20  15  37  18  21  19    AST 9 - 39 U/L 13  14  23  17  20  16          Latest Ref Rng & Units 4/17/2024     3:35 PM   Thyroid   TSH 0.44 - 3.98 mIU/L 1.59           No data to display                 No results found for: \"HGBA1C\", \"ALBUR\"     The 10-year ASCVD risk score (Charlette VALENCIA, et al., 2019) is: 21.2%    Values used to calculate the score:      Age: 69 years      Sex: Male      Is Non- : No      Diabetic: No      Tobacco smoker: No      Systolic Blood Pressure: 149 mmHg      Is BP treated: No      HDL Cholesterol: 57.9 mg/dL      Total Cholesterol: 165 mg/dL     Echocardiogram: No results found for this or any previous visit.     Echocardiogram: No results found for this or any previous visit from the past 1825 days.    Stress Testing: No results found for this or " any previous visit from the past 1825 days.    Cardiac Catheterization: No results found for this or any previous visit from the past 1825 days.    Cardiac Scoring:     Impression  Coronary artery calcium score of  578.06    AAA : No results found for this or any previous visit from the past 1825 days.    OTHER: No results found for this or any previous visit from the past 1825 days.        Assessment/Plan:   1. Abnormal Heart Score CT  Referral to Cardiology      2. Hyperlipidemia, unspecified hyperlipidemia type  atorvastatin (Lipitor) 80 mg tablet           No problem-specific Assessment & Plan notes found for this encounter.       Fidel Dorado is a 69 y.o. male who presents establish care:    1.  Coronary artery disease (7/24/2024 coronary calcium score 578, left main 84, , left circumflex 38, RCA 5; increased from 193 in 2016), asymptomatic  2.  Hyperlipidemia    Guideline directed medical therapy: Atorvastatin 80 mg nightly for primary prevention.  He is on aspirin 81 mg daily.    Continued risk factor modification.    Return to clinic in 1 year.    ____________________________________________________________  Sagar Albrecht MD      Asymptomatic. Exercfises 45 dayas daily, moderate aerobic.

## 2025-03-05 ENCOUNTER — APPOINTMENT (OUTPATIENT)
Dept: CARDIOLOGY | Facility: CLINIC | Age: 70
End: 2025-03-05
Payer: COMMERCIAL

## 2025-08-21 ENCOUNTER — APPOINTMENT (OUTPATIENT)
Dept: PRIMARY CARE | Facility: CLINIC | Age: 70
End: 2025-08-21
Payer: COMMERCIAL

## 2025-08-21 VITALS
HEIGHT: 66 IN | HEART RATE: 60 BPM | OXYGEN SATURATION: 98 % | WEIGHT: 155 LBS | DIASTOLIC BLOOD PRESSURE: 66 MMHG | BODY MASS INDEX: 24.91 KG/M2 | SYSTOLIC BLOOD PRESSURE: 164 MMHG

## 2025-08-21 DIAGNOSIS — I10 PRIMARY HYPERTENSION: Primary | ICD-10-CM

## 2025-08-21 DIAGNOSIS — Z12.5 SCREENING FOR PROSTATE CANCER: ICD-10-CM

## 2025-08-21 DIAGNOSIS — E78.5 HYPERLIPIDEMIA, UNSPECIFIED HYPERLIPIDEMIA TYPE: ICD-10-CM

## 2025-08-21 PROCEDURE — 99214 OFFICE O/P EST MOD 30 MIN: CPT | Performed by: INTERNAL MEDICINE

## 2025-08-21 PROCEDURE — 3008F BODY MASS INDEX DOCD: CPT | Performed by: INTERNAL MEDICINE

## 2025-08-21 PROCEDURE — 1036F TOBACCO NON-USER: CPT | Performed by: INTERNAL MEDICINE

## 2025-08-21 PROCEDURE — 1159F MED LIST DOCD IN RCRD: CPT | Performed by: INTERNAL MEDICINE

## 2025-08-21 PROCEDURE — 3077F SYST BP >= 140 MM HG: CPT | Performed by: INTERNAL MEDICINE

## 2025-08-21 PROCEDURE — 3078F DIAST BP <80 MM HG: CPT | Performed by: INTERNAL MEDICINE

## 2025-08-21 RX ORDER — OLMESARTAN MEDOXOMIL 20 MG/1
20 TABLET ORAL DAILY
Qty: 90 TABLET | Refills: 1 | Status: SHIPPED | OUTPATIENT
Start: 2025-08-21 | End: 2026-02-17

## 2025-08-21 ASSESSMENT — COLUMBIA-SUICIDE SEVERITY RATING SCALE - C-SSRS
2. HAVE YOU ACTUALLY HAD ANY THOUGHTS OF KILLING YOURSELF?: NO
6. HAVE YOU EVER DONE ANYTHING, STARTED TO DO ANYTHING, OR PREPARED TO DO ANYTHING TO END YOUR LIFE?: NO
1. IN THE PAST MONTH, HAVE YOU WISHED YOU WERE DEAD OR WISHED YOU COULD GO TO SLEEP AND NOT WAKE UP?: NO

## 2025-08-21 ASSESSMENT — PATIENT HEALTH QUESTIONNAIRE - PHQ9
2. FEELING DOWN, DEPRESSED OR HOPELESS: NOT AT ALL
SUM OF ALL RESPONSES TO PHQ9 QUESTIONS 1 AND 2: 0
1. LITTLE INTEREST OR PLEASURE IN DOING THINGS: NOT AT ALL

## 2025-08-21 ASSESSMENT — ENCOUNTER SYMPTOMS
DEPRESSION: 0
OCCASIONAL FEELINGS OF UNSTEADINESS: 0
LOSS OF SENSATION IN FEET: 0

## 2025-11-26 ENCOUNTER — APPOINTMENT (OUTPATIENT)
Dept: PRIMARY CARE | Facility: CLINIC | Age: 70
End: 2025-11-26
Payer: COMMERCIAL

## 2026-02-11 ENCOUNTER — APPOINTMENT (OUTPATIENT)
Dept: CARDIOLOGY | Facility: CLINIC | Age: 71
End: 2026-02-11
Payer: COMMERCIAL